# Patient Record
Sex: MALE | Race: WHITE | NOT HISPANIC OR LATINO | URBAN - METROPOLITAN AREA
[De-identification: names, ages, dates, MRNs, and addresses within clinical notes are randomized per-mention and may not be internally consistent; named-entity substitution may affect disease eponyms.]

---

## 2020-07-25 ENCOUNTER — TELEPHONE ENCOUNTER (OUTPATIENT)
Dept: URBAN - METROPOLITAN AREA CLINIC 13 | Facility: CLINIC | Age: 60
End: 2020-07-25

## 2020-07-26 ENCOUNTER — TELEPHONE ENCOUNTER (OUTPATIENT)
Dept: URBAN - METROPOLITAN AREA CLINIC 13 | Facility: CLINIC | Age: 60
End: 2020-07-26

## 2020-07-26 RX ORDER — PANTOPRAZOLE SODIUM 40 MG/1
TAKE 1 TABLET BY MOUTH EVERY DAY TABLET, DELAYED RELEASE ORAL
Qty: 30 | Refills: 11 | Status: ACTIVE | COMMUNITY
Start: 2014-01-21

## 2020-07-26 RX ORDER — MOMETASONE FUROATE AND FORMOTEROL FUMARATE DIHYDRATE 100; 5 UG/1; UG/1
INHALE 2 PUFFS TWICE DAILY PRN AEROSOL RESPIRATORY (INHALATION)
Qty: 13 | Refills: 0 | Status: ACTIVE | COMMUNITY
Start: 2013-08-01

## 2020-07-26 RX ORDER — ALPRAZOLAM 1 MG/1
TABLET ORAL
Qty: 90 | Refills: 0 | Status: ACTIVE | COMMUNITY
Start: 2013-08-01

## 2020-07-26 RX ORDER — AMOXICILLIN 500 MG/1
CAPSULE ORAL
Qty: 30 | Refills: 0 | Status: ACTIVE | COMMUNITY
Start: 2014-01-23

## 2020-07-26 RX ORDER — SIMVASTATIN 40 MG/1
TAKE 1 TABLET DAILY TABLET, FILM COATED ORAL
Refills: 0 | Status: ACTIVE | COMMUNITY
Start: 2013-01-21

## 2020-07-26 RX ORDER — OXYCODONE AND ACETAMINOPHEN 5; 325 MG/1; MG/1
TABLET ORAL
Qty: 60 | Refills: 0 | Status: ACTIVE | COMMUNITY
Start: 2013-10-08

## 2020-07-26 RX ORDER — FLUOCINONIDE 0.5 MG/G
CREAM TOPICAL
Qty: 30 | Refills: 0 | Status: ACTIVE | COMMUNITY
Start: 2013-08-01

## 2020-08-27 ENCOUNTER — OFFICE VISIT (OUTPATIENT)
Dept: URBAN - METROPOLITAN AREA CLINIC 113 | Facility: CLINIC | Age: 60
End: 2020-08-27
Payer: MEDICARE

## 2020-08-27 VITALS
HEIGHT: 70 IN | BODY MASS INDEX: 35.5 KG/M2 | WEIGHT: 248 LBS | DIASTOLIC BLOOD PRESSURE: 83 MMHG | SYSTOLIC BLOOD PRESSURE: 133 MMHG | TEMPERATURE: 97.3 F | HEART RATE: 73 BPM | RESPIRATION RATE: 22 BRPM

## 2020-08-27 DIAGNOSIS — D12.6 TUBULAR ADENOMA OF COLON: ICD-10-CM

## 2020-08-27 DIAGNOSIS — K74.69 OTHER CIRRHOSIS OF LIVER: ICD-10-CM

## 2020-08-27 DIAGNOSIS — K76.6 PORTAL HYPERTENSION: ICD-10-CM

## 2020-08-27 PROBLEM — 19943007: Status: ACTIVE | Noted: 2020-08-27

## 2020-08-27 PROBLEM — 34742003: Status: ACTIVE | Noted: 2020-08-27

## 2020-08-27 PROCEDURE — G8427 DOCREV CUR MEDS BY ELIG CLIN: HCPCS | Performed by: INTERNAL MEDICINE

## 2020-08-27 PROCEDURE — 1036F TOBACCO NON-USER: CPT | Performed by: INTERNAL MEDICINE

## 2020-08-27 PROCEDURE — 3017F COLORECTAL CA SCREEN DOC REV: CPT | Performed by: INTERNAL MEDICINE

## 2020-08-27 PROCEDURE — G8421 BMI NOT CALCULATED: HCPCS | Performed by: INTERNAL MEDICINE

## 2020-08-27 PROCEDURE — 99203 OFFICE O/P NEW LOW 30 MIN: CPT | Performed by: INTERNAL MEDICINE

## 2020-08-27 RX ORDER — PANTOPRAZOLE SODIUM 40 MG/1
TAKE 1 TABLET BY MOUTH EVERY DAY TABLET, DELAYED RELEASE ORAL
Qty: 30 | Refills: 11 | Status: ACTIVE | COMMUNITY
Start: 2014-01-21

## 2020-08-27 RX ORDER — METFORMIN ER 500 MG 500 MG/1
2 TABLET WITH EVENING MEAL TABLET ORAL TWICE A DAY
Status: ACTIVE | COMMUNITY

## 2020-08-27 RX ORDER — GABAPENTIN 400 MG/1
1 TABLET CAPSULE ORAL
Status: ACTIVE | COMMUNITY

## 2020-08-27 RX ORDER — AMOXICILLIN 500 MG/1
CAPSULE ORAL
Qty: 30 | Refills: 0 | Status: DISCONTINUED | COMMUNITY
Start: 2014-01-23

## 2020-08-27 RX ORDER — MOMETASONE FUROATE AND FORMOTEROL FUMARATE DIHYDRATE 100; 5 UG/1; UG/1
INHALE 2 PUFFS TWICE DAILY PRN AEROSOL RESPIRATORY (INHALATION)
Qty: 13 | Refills: 0 | Status: DISCONTINUED | COMMUNITY
Start: 2013-08-01

## 2020-08-27 RX ORDER — OXYCODONE AND ACETAMINOPHEN 5; 325 MG/1; MG/1
TABLET ORAL
Qty: 60 | Refills: 0 | Status: DISCONTINUED | COMMUNITY
Start: 2013-10-08

## 2020-08-27 RX ORDER — FLUOCINONIDE 0.5 MG/G
CREAM TOPICAL
Qty: 30 | Refills: 0 | Status: DISCONTINUED | COMMUNITY
Start: 2013-08-01

## 2020-08-27 RX ORDER — SODIUM, POTASSIUM,MAG SULFATES 17.5-3.13G
354 ML SOLUTION, RECONSTITUTED, ORAL ORAL ONCE
Qty: 354 ML | Refills: 0 | OUTPATIENT

## 2020-08-27 RX ORDER — ALPRAZOLAM 1 MG/1
TABLET ORAL
Qty: 90 | Refills: 0 | Status: DISCONTINUED | COMMUNITY
Start: 2013-08-01

## 2020-08-27 RX ORDER — SIMVASTATIN 40 MG/1
TAKE 1 TABLET DAILY TABLET, FILM COATED ORAL
Refills: 0 | Status: DISCONTINUED | COMMUNITY
Start: 2013-01-21

## 2020-08-27 NOTE — PHYSICAL EXAM GASTROINTESTINAL
Abdomen , soft, luq tender, nondistended , no guarding or rigidity , no masses palpable , normal bowel sounds , Liver and Spleen , no hepatomegaly present , no hepatosplenomegaly , liver nontender , spleen not palpable

## 2020-08-27 NOTE — HPI-OTHER HISTORIES
12/2013 Mr. Vyas is a 53 year old gentleman with a history of HTN, hyperlipidemia, T2DM, anxiety, colonic diverticulosis, and colon tubular adenomas due surveillance in 2014 re-referred by Dr. Yung for evaluation of abdominal pain. He reports a 3 month history of epigastric bloating beginning one hour after his first "substantial" meal of the day, typically lunch, and radiates diffusely across his entire abdomen by the evening. His pain persists until the following morning and is ameliorated by defecation. He has also had an associated constipation predominant change in bowel habits requiring him to use MiraLax 17g qd-bid to have a daily BM. His baseline was previously very regular with 1-2 BMs/day. A 6 day trial of Amitiza 8mcg bid from Dr. Yung was marginally more effective than MiraLax and a prescription has not yet been approved per the pharmacy. Metamucil exacerbated his constipation and a trial of yogurt with probiotics was ineffective. No trials of MOM, Mag Citrate, or Linzess. He endorses nausea that he attributes to his abdominal bloating. No vomiting, dysphagia, GERD, fevers, BRBPR, or melena. He has unintentionally lost 6 lbs over the past 6 weeks. He states his blood sugars are within normal limits and does not test them every day. Labs were last drawn with his PCP 6 weeks ago. He reports a history of peptic ulcers on UGI series in his early 20's when he was drinking ETOH heavily Colonoscopy (11/6/09): good prep with moderate internal hemorrhoids, sigmoid diverticulosis, small 10mm lipoma 10cm proximal to the anus, and removal of three 3-5mm tubular adenomas in the recto-sigmoid, descending, and transverse colon diverticulosis.

## 2020-08-27 NOTE — HPI-TODAY'S VISIT:
58 y/o male presenting for evaluation. He was previously followd by Dr. Elkins and was last seen in 2013 in our office. He subsequently had an EGD in 2014 and has not been seen since then. He was referred for abdominal pain and abnormal CT findings of esophageal/gastric varices.   He was having abdominal pain and describes LUQ discomfort.  He states the pain had been ongoing for about 1 year but was never this bad. He also had worse pain when eating a few weeks ago; therefore stopped eating for 1 week. Slowly it has eased up and he has been able to eat foods again. He denies any nausea or emesis. He does drink ETOH but stopped after learning of his enlarged liver. He denies having any withdrawal.  ----- CT C/A/P 8/2020--hepatosplenomegaly, portal HTN, GE varices, gastric thickening, periportal lymph nodes, sclerotic left iliac bone lesion EGD 1/2014--esophagitis, gastritis, 5mm gastric polyp.  [facility] 11/2009--internal hemorrhoids, diverticulosis, 10mm lipoma, TA x 3.

## 2020-09-03 ENCOUNTER — OFFICE VISIT (OUTPATIENT)
Dept: URBAN - METROPOLITAN AREA CLINIC 113 | Facility: CLINIC | Age: 60
End: 2020-09-03

## 2020-09-11 ENCOUNTER — CLAIMS CREATED FROM THE CLAIM WINDOW (OUTPATIENT)
Dept: URBAN - METROPOLITAN AREA CLINIC 4 | Facility: CLINIC | Age: 60
End: 2020-09-11
Payer: MEDICARE

## 2020-09-11 ENCOUNTER — OFFICE VISIT (OUTPATIENT)
Dept: URBAN - METROPOLITAN AREA SURGERY CENTER 25 | Facility: SURGERY CENTER | Age: 60
End: 2020-09-11
Payer: MEDICARE

## 2020-09-11 DIAGNOSIS — Z86.010 H/O ADENOMATOUS POLYP OF COLON: ICD-10-CM

## 2020-09-11 DIAGNOSIS — D12.4 ADENOMA OF DESCENDING COLON: ICD-10-CM

## 2020-09-11 DIAGNOSIS — D12.2 BENIGN NEOPLASM OF ASCENDING COLON: ICD-10-CM

## 2020-09-11 DIAGNOSIS — K55.20 ANGIODYSPLASIA: ICD-10-CM

## 2020-09-11 PROCEDURE — 45385 COLONOSCOPY W/LESION REMOVAL: CPT | Performed by: INTERNAL MEDICINE

## 2020-09-11 PROCEDURE — G8907 PT DOC NO EVENTS ON DISCHARG: HCPCS | Performed by: INTERNAL MEDICINE

## 2020-09-11 PROCEDURE — 88305 TISSUE EXAM BY PATHOLOGIST: CPT | Performed by: PATHOLOGY

## 2020-09-11 PROCEDURE — G9936 PMH PLYP/NEO CO/RECT/JUN/ANS: HCPCS | Performed by: INTERNAL MEDICINE

## 2020-09-11 RX ORDER — METFORMIN ER 500 MG 500 MG/1
2 TABLET WITH EVENING MEAL TABLET ORAL TWICE A DAY
Status: ACTIVE | COMMUNITY

## 2020-09-11 RX ORDER — SODIUM, POTASSIUM,MAG SULFATES 17.5-3.13G
354 ML SOLUTION, RECONSTITUTED, ORAL ORAL ONCE
Qty: 354 ML | Refills: 0 | Status: ACTIVE | COMMUNITY

## 2020-09-11 RX ORDER — PANTOPRAZOLE SODIUM 40 MG/1
TAKE 1 TABLET BY MOUTH EVERY DAY TABLET, DELAYED RELEASE ORAL
Qty: 30 | Refills: 11 | Status: ACTIVE | COMMUNITY
Start: 2014-01-21

## 2020-09-11 RX ORDER — GABAPENTIN 400 MG/1
1 TABLET CAPSULE ORAL
Status: ACTIVE | COMMUNITY

## 2020-09-22 ENCOUNTER — OFFICE VISIT (OUTPATIENT)
Dept: URBAN - METROPOLITAN AREA MEDICAL CENTER 2 | Facility: MEDICAL CENTER | Age: 60
End: 2020-09-22
Payer: MEDICARE

## 2020-09-22 DIAGNOSIS — K74.60 ADVANCED CIRRHOSIS OF LIVER: ICD-10-CM

## 2020-09-22 PROCEDURE — 43235 EGD DIAGNOSTIC BRUSH WASH: CPT | Performed by: INTERNAL MEDICINE

## 2020-09-23 ENCOUNTER — TELEPHONE ENCOUNTER (OUTPATIENT)
Dept: URBAN - METROPOLITAN AREA CLINIC 113 | Facility: CLINIC | Age: 60
End: 2020-09-23

## 2020-09-23 PROBLEM — 428054006: Status: ACTIVE | Noted: 2020-09-23

## 2020-10-06 ENCOUNTER — OFFICE VISIT (OUTPATIENT)
Dept: URBAN - METROPOLITAN AREA CLINIC 113 | Facility: CLINIC | Age: 60
End: 2020-10-06
Payer: MEDICARE

## 2020-10-06 ENCOUNTER — WEB ENCOUNTER (OUTPATIENT)
Dept: URBAN - METROPOLITAN AREA CLINIC 113 | Facility: CLINIC | Age: 60
End: 2020-10-06

## 2020-10-06 ENCOUNTER — DASHBOARD ENCOUNTERS (OUTPATIENT)
Age: 60
End: 2020-10-06

## 2020-10-06 VITALS
RESPIRATION RATE: 18 BRPM | WEIGHT: 255 LBS | SYSTOLIC BLOOD PRESSURE: 146 MMHG | BODY MASS INDEX: 36.51 KG/M2 | HEART RATE: 79 BPM | HEIGHT: 70 IN | TEMPERATURE: 97.3 F | DIASTOLIC BLOOD PRESSURE: 76 MMHG

## 2020-10-06 DIAGNOSIS — K70.30 ALCOHOLIC CIRRHOSIS OF LIVER WITHOUT ASCITES: ICD-10-CM

## 2020-10-06 DIAGNOSIS — E66.01 MORBID (SEVERE) OBESITY DUE TO EXCESS CALORIES: ICD-10-CM

## 2020-10-06 DIAGNOSIS — D36.9 TUBULAR ADENOMA: ICD-10-CM

## 2020-10-06 DIAGNOSIS — R74.8 ELEVATED LIVER ENZYMES: ICD-10-CM

## 2020-10-06 DIAGNOSIS — R16.2 HEPATOSPLENOMEGALY: ICD-10-CM

## 2020-10-06 PROBLEM — 83911000119104: Status: ACTIVE | Noted: 2020-10-06

## 2020-10-06 PROBLEM — 238136002: Status: ACTIVE | Noted: 2020-10-06

## 2020-10-06 PROBLEM — 420054005: Status: ACTIVE | Noted: 2020-10-06

## 2020-10-06 PROBLEM — 36760000: Status: ACTIVE | Noted: 2020-10-06

## 2020-10-06 PROBLEM — 444408007: Status: ACTIVE | Noted: 2020-10-06

## 2020-10-06 PROBLEM — 707724006: Status: ACTIVE | Noted: 2020-10-06

## 2020-10-06 PROCEDURE — 3017F COLORECTAL CA SCREEN DOC REV: CPT | Performed by: INTERNAL MEDICINE

## 2020-10-06 PROCEDURE — 99213 OFFICE O/P EST LOW 20 MIN: CPT | Performed by: INTERNAL MEDICINE

## 2020-10-06 PROCEDURE — 1036F TOBACCO NON-USER: CPT | Performed by: INTERNAL MEDICINE

## 2020-10-06 PROCEDURE — G8484 FLU IMMUNIZE NO ADMIN: HCPCS | Performed by: INTERNAL MEDICINE

## 2020-10-06 PROCEDURE — G8428 CUR MEDS NOT DOCUMENT: HCPCS | Performed by: INTERNAL MEDICINE

## 2020-10-06 PROCEDURE — G8421 BMI NOT CALCULATED: HCPCS | Performed by: INTERNAL MEDICINE

## 2020-10-06 RX ORDER — SODIUM, POTASSIUM,MAG SULFATES 17.5-3.13G
354 ML SOLUTION, RECONSTITUTED, ORAL ORAL ONCE
Qty: 354 ML | Refills: 0 | Status: DISCONTINUED | COMMUNITY

## 2020-10-06 RX ORDER — METFORMIN ER 500 MG 500 MG/1
2 TABLET WITH EVENING MEAL TABLET ORAL TWICE A DAY
Status: ACTIVE | COMMUNITY

## 2020-10-06 RX ORDER — PANTOPRAZOLE SODIUM 40 MG/1
TAKE 1 TABLET BY MOUTH EVERY DAY TABLET, DELAYED RELEASE ORAL
Qty: 30 | Refills: 11 | Status: ACTIVE | COMMUNITY
Start: 2014-01-21

## 2020-10-06 RX ORDER — QUINAPRIL HYDROCHLORIDE 20 MG/1
1 TABLET TABLET, FILM COATED ORAL ONCE A DAY
Status: ACTIVE | COMMUNITY

## 2020-10-06 RX ORDER — GABAPENTIN 400 MG/1
1 TABLET CAPSULE ORAL
Status: ACTIVE | COMMUNITY

## 2020-10-06 NOTE — HPI-TODAY'S VISIT:
58 y/o male presenting for f/u after EGD/Colonoscopy.  He was seen recently several weeks ago with abdominal pain. He does have a hx of cirrhosis. His CT and doppler US were unremarkable. Patient is without any abdominal complaints. No dysphagia, heartburn, regurgitation, unintentional weight loss, nausea, vomiting, hematemesis or melena. Bowels are moving regularly without blood per rectum. No complaints of bloating. No jaundice, icterus. He was having LUQ pain but that has since resolved since he stopped drinking. ----- Doppler US 9/2020--hepatosplenomegaly. No mass/ascites. [facility] 9/2020--TA x 5, hemorrhoids, AVM EGD 9/2020--normal. CT C/A/P 8/2020--hepatosplenomegaly, portal HTN, GE varices, gastric thickening, periportal lymph nodes, sclerotic left iliac bone lesion EGD 1/2014--esophagitis, gastritis, 5mm gastric polyp.  [facility] 11/2009--internal hemorrhoids, diverticulosis, 10mm lipoma, TA x 3. ----- 8/27/2020 58 y/o male presenting for evaluation. He was previously followd by Dr. Elknis and was last seen in 2013 in our office. He subsequently had an EGD in 2014 and has not been seen since then. He was referred for abdominal pain and abnormal CT findings of esophageal/gastric varices.   He was having abdominal pain and describes LUQ discomfort.  He states the pain had been ongoing for about 1 year but was never this bad. He also had worse pain when eating a few weeks ago; therefore stopped eating for 1 week. Slowly it has eased up and he has been able to eat foods again. He denies any nausea or emesis. He does drink ETOH but stopped after learning of his enlarged liver. He denies having any withdrawal.